# Patient Record
Sex: FEMALE | Race: WHITE | HISPANIC OR LATINO
[De-identification: names, ages, dates, MRNs, and addresses within clinical notes are randomized per-mention and may not be internally consistent; named-entity substitution may affect disease eponyms.]

---

## 2023-12-01 PROBLEM — Z00.00 ENCOUNTER FOR PREVENTIVE HEALTH EXAMINATION: Status: ACTIVE | Noted: 2023-12-01

## 2023-12-04 ENCOUNTER — APPOINTMENT (OUTPATIENT)
Dept: ULTRASOUND IMAGING | Facility: CLINIC | Age: 33
End: 2023-12-04
Payer: MEDICAID

## 2023-12-04 PROCEDURE — 76856 US EXAM PELVIC COMPLETE: CPT

## 2023-12-04 PROCEDURE — 76830 TRANSVAGINAL US NON-OB: CPT

## 2024-02-05 ENCOUNTER — NON-APPOINTMENT (OUTPATIENT)
Age: 34
End: 2024-02-05

## 2024-02-05 ENCOUNTER — APPOINTMENT (OUTPATIENT)
Dept: CARDIOLOGY | Facility: CLINIC | Age: 34
End: 2024-02-05
Payer: MEDICAID

## 2024-02-05 VITALS — SYSTOLIC BLOOD PRESSURE: 114 MMHG | DIASTOLIC BLOOD PRESSURE: 72 MMHG

## 2024-02-05 VITALS
HEIGHT: 57 IN | WEIGHT: 217 LBS | BODY MASS INDEX: 46.82 KG/M2 | SYSTOLIC BLOOD PRESSURE: 124 MMHG | DIASTOLIC BLOOD PRESSURE: 72 MMHG

## 2024-02-05 VITALS — OXYGEN SATURATION: 98 % | HEART RATE: 87 BPM

## 2024-02-05 DIAGNOSIS — Z78.9 OTHER SPECIFIED HEALTH STATUS: ICD-10-CM

## 2024-02-05 DIAGNOSIS — R06.01 ORTHOPNEA: ICD-10-CM

## 2024-02-05 DIAGNOSIS — Z87.898 PERSONAL HISTORY OF OTHER SPECIFIED CONDITIONS: ICD-10-CM

## 2024-02-05 DIAGNOSIS — R07.9 CHEST PAIN, UNSPECIFIED: ICD-10-CM

## 2024-02-05 DIAGNOSIS — R60.0 LOCALIZED EDEMA: ICD-10-CM

## 2024-02-05 DIAGNOSIS — R00.0 TACHYCARDIA, UNSPECIFIED: ICD-10-CM

## 2024-02-05 DIAGNOSIS — Z87.891 PERSONAL HISTORY OF NICOTINE DEPENDENCE: ICD-10-CM

## 2024-02-05 DIAGNOSIS — Z82.49 FAMILY HISTORY OF ISCHEMIC HEART DISEASE AND OTHER DISEASES OF THE CIRCULATORY SYSTEM: ICD-10-CM

## 2024-02-05 DIAGNOSIS — M25.512 PAIN IN LEFT SHOULDER: ICD-10-CM

## 2024-02-05 PROCEDURE — G2211 COMPLEX E/M VISIT ADD ON: CPT | Mod: NC,1L

## 2024-02-05 PROCEDURE — 93000 ELECTROCARDIOGRAM COMPLETE: CPT

## 2024-02-05 PROCEDURE — T1013A: CUSTOM

## 2024-02-05 PROCEDURE — 99205 OFFICE O/P NEW HI 60 MIN: CPT | Mod: 25

## 2024-02-05 RX ORDER — METFORMIN HYDROCHLORIDE 500 MG/1
500 TABLET, COATED ORAL
Refills: 0 | Status: ACTIVE | COMMUNITY
Start: 2024-02-05

## 2024-02-05 NOTE — DISCUSSION/SUMMARY
[FreeTextEntry1] : #Chest pain -Exertional chest pain that is episodic that radiates to the arm.  EKG today without ischemic changes. -Will obtain exercise stress test. -Will need to obtain PCP records and last lipid panel.  #Dyspnea on exertion and orthopnea -No clinical signs of heart failure today.  Will obtain echocardiogram screening. -Obesity may be a component.  BMI 46.  If above testing unremarkable, will encourage to start doing a regular exercise program.  #Prior preeclampsia and gestational hypertension -She is a high risk of developing hypertension in the future.  BP today 124/72.  Will need close monitoring.  Follow-up in office after above testing to review. [EKG obtained to assist in diagnosis and management of assessed problem(s)] : EKG obtained to assist in diagnosis and management of assessed problem(s)

## 2024-02-05 NOTE — PHYSICAL EXAM
[Obese] : obese [Normal] : soft, non-tender, no masses/organomegaly, normal bowel sounds [Normal Gait] : normal gait [No Edema] : no edema [No Rash] : no rash [Moves all extremities] : moves all extremities [Alert and Oriented] : alert and oriented [de-identified] : JVP around 8-10cm laying at 45 degrees [de-identified] : warm

## 2024-02-05 NOTE — REASON FOR VISIT
[Symptom and Test Evaluation] : symptom and test evaluation [Time Spent: ____ minutes] : Total time spent using  services: [unfilled] minutes. The patient's primary language is not English thus required  services. [Interpreters_IDNumber] : [id number] [Interpreters_FullName] : Key [TWNoteComboBox1] : Cameroonian

## 2024-02-05 NOTE — CARDIOLOGY SUMMARY
[de-identified] : 2/5/2024: NSR, HR 80, no ischemic changes, low voltage precordial leads and poor R wave progression.  May be secondary to lead placement.

## 2024-02-05 NOTE — ASSESSMENT
[FreeTextEntry1] : 32 yo F with history of preeclampsia (last birth 2013) presents today for initial cardiac evaluation.

## 2024-02-05 NOTE — HISTORY OF PRESENT ILLNESS
[FreeTextEntry1] : 32 yo F with history of preeclampsia (last birth 2013) presents today for initial cardiac evaluation.  Patient has noted for the past 1 month she has had episodic chest pain that occurs centrally and will radiate down her arm.  This will occur when she is moving or cleaning.  She is also noted some increased shortness of breath lower extremity swelling as well as fatigue that has occurred over this time.  It is independent of the chest pain episodes.  She has been having to sleep in a sitting position over the past month.  She is currently not working.  She does have a history of hypertension and preeclampsia during her most recent delivery in 2014.  She does not have a family history of early cardiac disease.  She denies smoking or excessive drinking.

## 2024-02-06 LAB — HBA1C MFR BLD HPLC: 11.1

## 2024-02-14 ENCOUNTER — APPOINTMENT (OUTPATIENT)
Dept: ULTRASOUND IMAGING | Facility: CLINIC | Age: 34
End: 2024-02-14

## 2024-02-29 ENCOUNTER — APPOINTMENT (OUTPATIENT)
Dept: CARDIOLOGY | Facility: CLINIC | Age: 34
End: 2024-02-29
Payer: MEDICAID

## 2024-02-29 PROCEDURE — 93306 TTE W/DOPPLER COMPLETE: CPT

## 2024-02-29 PROCEDURE — 93356 MYOCRD STRAIN IMG SPCKL TRCK: CPT

## 2024-03-11 ENCOUNTER — APPOINTMENT (OUTPATIENT)
Dept: CARDIOLOGY | Facility: CLINIC | Age: 34
End: 2024-03-11
Payer: MEDICAID

## 2024-03-11 VITALS
SYSTOLIC BLOOD PRESSURE: 110 MMHG | HEART RATE: 69 BPM | BODY MASS INDEX: 48.98 KG/M2 | HEIGHT: 57 IN | OXYGEN SATURATION: 99 % | DIASTOLIC BLOOD PRESSURE: 80 MMHG | WEIGHT: 227 LBS

## 2024-03-11 DIAGNOSIS — O14.90 UNSPECIFIED PRE-ECLAMPSIA, UNSPECIFIED TRIMESTER: ICD-10-CM

## 2024-03-11 DIAGNOSIS — R06.02 SHORTNESS OF BREATH: ICD-10-CM

## 2024-03-11 DIAGNOSIS — R00.2 PALPITATIONS: ICD-10-CM

## 2024-03-11 DIAGNOSIS — R07.89 OTHER CHEST PAIN: ICD-10-CM

## 2024-03-11 PROCEDURE — 99214 OFFICE O/P EST MOD 30 MIN: CPT | Mod: 25

## 2024-03-11 PROCEDURE — 93015 CV STRESS TEST SUPVJ I&R: CPT

## 2024-03-11 PROCEDURE — G2211 COMPLEX E/M VISIT ADD ON: CPT | Mod: NC,1L

## 2024-03-11 NOTE — CARDIOLOGY SUMMARY
[de-identified] : 2/5/2024: NSR, HR 80, no ischemic changes, low voltage precordial leads and poor R wave progression.  May be secondary to lead placement. [de-identified] : ETT (full report pending): 6:30 min, MPHR 85%, no ischemic changes on my review.  [de-identified] : 3/1/24: LVEF 60-65%, GLS -20.8%, LVEDD 3.7cm, IVSD 1.0cm, TAPSE 1.9cm, normal RV and diastolic function, trace MR, trace TR PASP 26mmHG.

## 2024-03-11 NOTE — DISCUSSION/SUMMARY
[FreeTextEntry1] : #Chest pain - Resolved.  Previously had exertional chest pain that is episodic that radiates to the arm.   - ETT today without ischemic changes at MPHR of 85% predicted.   - echocardiogram without any wall motion or valvular abnormalities. - can consider coronary CTA in the future if there are any ongoing concerns.    #Dyspnea on exertion and orthopnea -No clinical signs of heart failure today. EF preserved on echo.  -Obesity may be a component.  BMI 46. - Encouraged her to start doing a regular exercise program and seek out nutritionist/dietician for weight loss options. She would be a good candidate for Ozempic.  With her HBA1C off 11.1%, she would need to do this under the guidance of an endocrinologist.    #Prior preeclampsia and gestational hypertension -She is a high risk of developing hypertension in the future.  BP today 124/72.  Will need close monitoring.  # Cardiac Risk Counselling -Lipid panel 2/1/2024: , , , HDL 23. ASCVD risk of 2.3%.  - HBA1C 11.1% - followed by endocrinologist.  Now on metformin.    Follow-up annually or sooner if symptoms.

## 2024-03-11 NOTE — PHYSICAL EXAM
[Obese] : obese [Normal] : soft, non-tender, no masses/organomegaly, normal bowel sounds [Normal Gait] : normal gait [No Edema] : no edema [No Rash] : no rash [Moves all extremities] : moves all extremities [Alert and Oriented] : alert and oriented [de-identified] : JVP around 8-10cm laying at 45 degrees [de-identified] : warm

## 2024-03-11 NOTE — HISTORY OF PRESENT ILLNESS
[FreeTextEntry1] : 34 yo F with history of preeclampsia (last birth 2014), and DM2 presents today for review of cardiac testing.  I first met her on 2/5/24.  At that visit she endorsed 1 month of episodic chest pain that occurs centrally and will radiate down her arm.  This will occur when she is moving or cleaning.  She is also noted some increased shortness of breath lower extremity swelling as well as fatigue that has occurred over this time.  I had ordered an ETT as well as echocardiogram on her last visit.    Today, she reports feeling well.  She denies any further chest pain or shortness of breath.   She is currently not working.  She does have a history of hypertension and preeclampsia during her most recent delivery in 2014.  She does not have a family history of early cardiac disease.  She denies smoking or excessive drinking.

## 2024-03-11 NOTE — REASON FOR VISIT
[Symptom and Test Evaluation] : symptom and test evaluation [Interpreters_FullName] : Marline [Interpreters_IDNumber] : [id number] [TWNoteComboBox1] : [place]

## 2024-03-11 NOTE — ASSESSMENT
[FreeTextEntry1] : 34 yo F with history of preeclampsia (last birth 2013) presents today for review of cardiac testing.

## 2024-08-21 ENCOUNTER — APPOINTMENT (OUTPATIENT)
Dept: ENDOCRINOLOGY | Facility: CLINIC | Age: 34
End: 2024-08-21
Payer: MEDICAID

## 2024-08-21 VITALS
HEIGHT: 57 IN | HEART RATE: 79 BPM | BODY MASS INDEX: 48.98 KG/M2 | SYSTOLIC BLOOD PRESSURE: 112 MMHG | TEMPERATURE: 96.4 F | DIASTOLIC BLOOD PRESSURE: 80 MMHG | WEIGHT: 227 LBS | OXYGEN SATURATION: 97 %

## 2024-08-21 DIAGNOSIS — E66.01 MORBID (SEVERE) OBESITY DUE TO EXCESS CALORIES: ICD-10-CM

## 2024-08-21 DIAGNOSIS — E11.9 TYPE 2 DIABETES MELLITUS W/OUT COMPLICATIONS: ICD-10-CM

## 2024-08-21 PROCEDURE — 99205 OFFICE O/P NEW HI 60 MIN: CPT

## 2024-08-21 NOTE — ASSESSMENT
[FreeTextEntry1] : - Uncontrolled Diabetes- likely T2DM A1C 11.1% in Jan 2024  Plan:  - She will call office and let me know the name of insulin  - check labs ASAP - Pt was advised to avoid pregnancy until diabetes is controlled to avoid fetal anomalies.  - Nutritional counselling was initiated. Appreciate CDE consult for further education. Pt is interested to wear CGM.   This patient with diabetes Injects insulin 1+ times daily  is currently on CGM, testing glucose continuously Has been seen in the office by a provider within the last 6-month to review CGM data with their provider CGM is medically necessary for this patient CGM will improve/maintain A1c CGM will reduce hypoglycemic events Gen and Dexcom each require 1 test strip daily to use in case of sensor failure or for blood glucose verification or during sensor warm   2. Obesity Pt would benefit from GLP-1 RA. will discuss this after lab results are reviewed.  Will check thyroid profile and lipid panel next appointment.  Pt would need sleep study    Follow up in 2 month

## 2024-08-21 NOTE — HISTORY OF PRESENT ILLNESS
[FreeTextEntry1] : 33 year old Prydeinig speaking women with medical history of diabetes (? type), class 3 obesity (BMI 49) presented to [facility].  Conversation was carried on with assistance form .   No recent labs.   Jan 2024- A1C 11.1%, Tg 239,   States she was diagnosed with diabetes about a year ago, following symptoms of polyuria, polydipsia and blurry vision. Initially she was told she may have Type 1 diabetes and later she was told she has T2DM. Didn't require any hospitalization regarding DM management.   She has been administering insulin (? name) since a month ago- 20 U BID. Checks finger blood glucoses- usually between 250-350 mg/dl. Administers insulin on lower abdomen, upper thigh or upper arm. Reports compliance with insulin administration.    Eats 2 meals a day- including rice and pasta are main staples. Sometimes snacks in between. No recent change in weight or diet.    She has 2 children (11 and 12 years old). Didn't require any fertility assistance.  She is sexually active. No plan for near future pregnancy but wants to have more children in future. Uses condoms.   Her mother and brother have diabetes. Mother administers insulin.

## 2024-08-26 ENCOUNTER — APPOINTMENT (OUTPATIENT)
Dept: ENDOCRINOLOGY | Facility: CLINIC | Age: 34
End: 2024-08-26
Payer: MEDICAID

## 2024-08-26 PROCEDURE — G0108 DIAB MANAGE TRN  PER INDIV: CPT

## 2024-08-27 RX ORDER — BLOOD-GLUCOSE SENSOR
EACH MISCELLANEOUS
Qty: 3 | Refills: 5 | Status: ACTIVE | COMMUNITY
Start: 2024-08-26 | End: 1900-01-01

## 2024-08-27 RX ORDER — BLOOD-GLUCOSE,RECEIVER,CONT
EACH MISCELLANEOUS
Qty: 1 | Refills: 0 | Status: ACTIVE | COMMUNITY
Start: 2024-08-26 | End: 1900-01-01

## 2024-09-06 ENCOUNTER — NON-APPOINTMENT (OUTPATIENT)
Age: 34
End: 2024-09-06

## 2024-09-06 RX ORDER — INSULIN GLARGINE 100 [IU]/ML
100 INJECTION, SOLUTION SUBCUTANEOUS
Qty: 6 | Refills: 3 | Status: ACTIVE | COMMUNITY
Start: 2024-09-06 | End: 1900-01-01

## 2024-09-06 RX ORDER — PEN NEEDLE, DIABETIC 29 G X1/2"
32G X 4 MM NEEDLE, DISPOSABLE MISCELLANEOUS
Qty: 1 | Refills: 2 | Status: ACTIVE | COMMUNITY
Start: 2024-09-06 | End: 1900-01-01

## 2024-09-06 RX ORDER — SEMAGLUTIDE 0.68 MG/ML
2 INJECTION, SOLUTION SUBCUTANEOUS
Qty: 1 | Refills: 2 | Status: ACTIVE | COMMUNITY
Start: 2024-09-06 | End: 1900-01-01

## 2024-09-11 ENCOUNTER — APPOINTMENT (OUTPATIENT)
Dept: ENDOCRINOLOGY | Facility: CLINIC | Age: 34
End: 2024-09-11

## 2024-09-19 ENCOUNTER — NON-APPOINTMENT (OUTPATIENT)
Age: 34
End: 2024-09-19

## 2024-10-22 ENCOUNTER — APPOINTMENT (OUTPATIENT)
Dept: ENDOCRINOLOGY | Facility: CLINIC | Age: 34
End: 2024-10-22
Payer: MEDICAID

## 2024-10-22 VITALS
HEIGHT: 57 IN | OXYGEN SATURATION: 99 % | BODY MASS INDEX: 47.46 KG/M2 | HEART RATE: 103 BPM | WEIGHT: 220 LBS | TEMPERATURE: 96.4 F | DIASTOLIC BLOOD PRESSURE: 58 MMHG | SYSTOLIC BLOOD PRESSURE: 96 MMHG

## 2024-10-22 DIAGNOSIS — E11.9 TYPE 2 DIABETES MELLITUS W/OUT COMPLICATIONS: ICD-10-CM

## 2024-10-22 DIAGNOSIS — E66.01 MORBID (SEVERE) OBESITY DUE TO EXCESS CALORIES: ICD-10-CM

## 2024-10-22 LAB — GLUCOSE BLDC GLUCOMTR-MCNC: 156

## 2024-10-22 PROCEDURE — 99215 OFFICE O/P EST HI 40 MIN: CPT | Mod: 25

## 2024-10-22 PROCEDURE — 95251 CONT GLUC MNTR ANALYSIS I&R: CPT

## 2024-10-22 RX ORDER — METFORMIN HYDROCHLORIDE 1000 MG/1
1000 TABLET, COATED ORAL
Qty: 180 | Refills: 1 | Status: ACTIVE | COMMUNITY
Start: 2024-10-22 | End: 1900-01-01

## 2024-12-08 ENCOUNTER — OUTPATIENT (OUTPATIENT)
Dept: OUTPATIENT SERVICES | Facility: HOSPITAL | Age: 34
LOS: 1 days | End: 2024-12-08
Payer: MEDICAID

## 2024-12-08 DIAGNOSIS — G47.33 OBSTRUCTIVE SLEEP APNEA (ADULT) (PEDIATRIC): ICD-10-CM

## 2024-12-08 PROCEDURE — 95810 POLYSOM 6/> YRS 4/> PARAM: CPT | Mod: 26

## 2024-12-08 PROCEDURE — 95810 POLYSOM 6/> YRS 4/> PARAM: CPT

## 2024-12-17 ENCOUNTER — NON-APPOINTMENT (OUTPATIENT)
Age: 34
End: 2024-12-17

## 2025-01-27 DIAGNOSIS — R73.9 HYPERGLYCEMIA, UNSPECIFIED: ICD-10-CM

## 2025-01-27 DIAGNOSIS — R73.09 OTHER ABNORMAL GLUCOSE: ICD-10-CM

## 2025-01-27 LAB — HBA1C MFR BLD HPLC: 11.6

## 2025-03-27 ENCOUNTER — NON-APPOINTMENT (OUTPATIENT)
Age: 35
End: 2025-03-27

## 2025-04-01 ENCOUNTER — APPOINTMENT (OUTPATIENT)
Dept: ENDOCRINOLOGY | Facility: CLINIC | Age: 35
End: 2025-04-01
Payer: MEDICAID

## 2025-04-01 VITALS
HEART RATE: 87 BPM | HEIGHT: 57 IN | OXYGEN SATURATION: 97 % | SYSTOLIC BLOOD PRESSURE: 104 MMHG | BODY MASS INDEX: 47.46 KG/M2 | TEMPERATURE: 95.9 F | WEIGHT: 220 LBS | DIASTOLIC BLOOD PRESSURE: 72 MMHG

## 2025-04-01 DIAGNOSIS — E66.01 MORBID (SEVERE) OBESITY DUE TO EXCESS CALORIES: ICD-10-CM

## 2025-04-01 DIAGNOSIS — E11.9 TYPE 2 DIABETES MELLITUS W/OUT COMPLICATIONS: ICD-10-CM

## 2025-04-01 LAB — GLUCOSE BLDC GLUCOMTR-MCNC: 242

## 2025-04-01 PROCEDURE — 99215 OFFICE O/P EST HI 40 MIN: CPT | Mod: 25

## 2025-04-01 PROCEDURE — 82962 GLUCOSE BLOOD TEST: CPT

## 2025-04-01 RX ORDER — BLOOD SUGAR DIAGNOSTIC
STRIP MISCELLANEOUS DAILY
Qty: 1 | Refills: 1 | Status: ACTIVE | COMMUNITY
Start: 2025-04-01 | End: 1900-01-01

## 2025-04-01 RX ORDER — SEMAGLUTIDE 0.68 MG/ML
2 INJECTION, SOLUTION SUBCUTANEOUS
Qty: 1 | Refills: 3 | Status: ACTIVE | COMMUNITY
Start: 2025-04-01 | End: 1900-01-01

## 2025-04-01 RX ORDER — LANCETS 30 GAUGE
EACH MISCELLANEOUS
Qty: 1 | Refills: 1 | Status: ACTIVE | COMMUNITY
Start: 2025-04-01 | End: 1900-01-01

## 2025-04-01 RX ORDER — METFORMIN HYDROCHLORIDE 1000 MG/1
1000 TABLET, COATED ORAL DAILY
Qty: 90 | Refills: 1 | Status: ACTIVE | COMMUNITY
Start: 2025-04-01 | End: 1900-01-01

## 2025-04-01 RX ORDER — DEXAMETHASONE 0.5 MG/.5MG
0.5 TABLET ORAL
Qty: 8 | Refills: 0 | Status: ACTIVE | COMMUNITY
Start: 2025-04-01 | End: 1900-01-01

## 2025-04-01 RX ORDER — ISOPROPYL ALCOHOL 0.75 G/1
70 SWAB TOPICAL
Qty: 1 | Refills: 1 | Status: ACTIVE | COMMUNITY
Start: 2025-04-01 | End: 1900-01-01

## 2025-04-01 RX ORDER — BLOOD-GLUCOSE METER
W/DEVICE EACH MISCELLANEOUS
Qty: 1 | Refills: 0 | Status: ACTIVE | COMMUNITY
Start: 2025-04-01 | End: 1900-01-01

## 2025-04-01 RX ORDER — INSULIN GLARGINE 100 [IU]/ML
100 INJECTION, SOLUTION SUBCUTANEOUS
Qty: 5 | Refills: 2 | Status: ACTIVE | COMMUNITY
Start: 2025-04-01 | End: 1900-01-01

## 2025-04-16 ENCOUNTER — APPOINTMENT (OUTPATIENT)
Dept: ENDOCRINOLOGY | Facility: CLINIC | Age: 35
End: 2025-04-16
Payer: MEDICAID

## 2025-04-16 PROCEDURE — G0108 DIAB MANAGE TRN  PER INDIV: CPT

## 2025-04-16 RX ORDER — BLOOD-GLUCOSE SENSOR
EACH MISCELLANEOUS
Qty: 3 | Refills: 5 | Status: ACTIVE | COMMUNITY
Start: 2025-04-16 | End: 1900-01-01

## 2025-05-08 ENCOUNTER — RX CHANGE (OUTPATIENT)
Age: 35
End: 2025-05-08

## 2025-06-02 DIAGNOSIS — Z86.69 PERSONAL HISTORY OF OTHER DISEASES OF THE NERVOUS SYSTEM AND SENSE ORGANS: ICD-10-CM

## 2025-06-02 DIAGNOSIS — Z87.898 PERSONAL HISTORY OF OTHER SPECIFIED CONDITIONS: ICD-10-CM

## 2025-06-02 DIAGNOSIS — R63.1 POLYDIPSIA: ICD-10-CM

## 2025-07-07 ENCOUNTER — APPOINTMENT (OUTPATIENT)
Dept: ENDOCRINOLOGY | Facility: CLINIC | Age: 35
End: 2025-07-07